# Patient Record
Sex: MALE | Race: WHITE
[De-identification: names, ages, dates, MRNs, and addresses within clinical notes are randomized per-mention and may not be internally consistent; named-entity substitution may affect disease eponyms.]

---

## 2018-02-04 ENCOUNTER — HOSPITAL ENCOUNTER (EMERGENCY)
Dept: HOSPITAL 74 - JER | Age: 73
Discharge: HOME | End: 2018-02-04
Payer: COMMERCIAL

## 2018-02-04 VITALS — SYSTOLIC BLOOD PRESSURE: 149 MMHG | DIASTOLIC BLOOD PRESSURE: 98 MMHG | TEMPERATURE: 97.6 F | HEART RATE: 76 BPM

## 2018-02-04 VITALS — BODY MASS INDEX: 38.7 KG/M2
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
## 2018-02-04 DIAGNOSIS — I10: ICD-10-CM

## 2018-02-04 DIAGNOSIS — G20: ICD-10-CM
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
## 2018-02-04 DIAGNOSIS — E78.00: ICD-10-CM

## 2018-02-04 DIAGNOSIS — J44.9: ICD-10-CM
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
## 2018-02-04 DIAGNOSIS — M54.5: Primary | ICD-10-CM

## 2018-02-04 NOTE — PDOC
History of Present Illness





- General


Chief Complaint: Injury


Stated Complaint: BACK PAIN S/P FALL


Time Seen by Provider: 02/04/18 04:07


History Source: Patient


Exam Limitations: No Limitations





- History of Present Illness


Initial Comments: 





02/04/18 06:13


72-year-old male with history of Parkinson's presents to the emergency 

department complaining of low back pain. Patient states she tripped on a piece 

of carpet and slid onto the ground after hitting against the wall. He denied 

any loss of consciousness, headache, dizziness, Lightheadedness, facial pains, 

neck pains, chest pain, shortness of breath, abdominal pains, flank pains, 

urinary symptoms, bladder or bowel dysfunction. Pain is described as 5/10 dull 

nonradiating intermittent discomfort. Pain is exacerbated on certain movements 

and alleviated at rest.


Occurred: reports: this morning





Past History





- Past Medical History


Allergies/Adverse Reactions: 


 Allergies











Allergy/AdvReac Type Severity Reaction Status Date / Time


 


No Known Allergies Allergy   Verified 02/04/18 03:24











Home Medications: 


Ambulatory Orders





NK [No Known Home Medication]  02/04/18 








Hypercholesterolemia: Yes





- Suicide/Smoking/Psychosocial Hx


Smoking Status: No


Smoking History: Never smoked


Have you smoked in the past 12 months: No


Number of Cigarettes Smoked Daily: 0


Information on smoking cessation initiated: No


Hx Alcohol Use: No


Drug/Substance Use Hx: No


Substance Use Type: None


Hx Substance Use Treatment: No





**Review of Systems





- Review of Systems


Able to Perform ROS?: Yes


Comments:: 





02/04/18 06:15


CONSTITUTIONAL: 


Absent: fever, chills, diaphoresis, generalized weakness, malaise, loss of 

appetite


HEENT: 


Absent: rhinorrhea, nasal congestion, throat pain, throat swelling, difficulty 

swallowing, mouth swelling, ear pain, eye pain, visual Changes


CARDIOVASCULAR: 


Absent: chest pain, loss of consciousness, palpitations, irregular heart rate, 

peripheral edema


RESPIRATORY: 


Absent: cough, shortness of breath, dyspnea with exertion, orthopnea, wheezing, 

stridor, hemoptysis


GASTROINTESTINAL:


Absent: abdominal pain, abdominal distension, nausea, vomiting, diarrhea, 

constipation, melena, hematochezia


GENITOURINARY: 


Absent: dysuria, frequency, urgency, hesitancy, hematuria, flank pain, genital 

pain


MUSCULOSKELETAL: 


+left para lum,bar/low back pain


Absent: myalgia, arthralgia, joint swelling


SKIN: 


Absent: rash, itching, pallor


HEMATOLOGIC/IMMUNOLOGIC: 


Absent: easy bleeding, easy bruising, lymphadenopathy, frequent infections


ENDOCRINE:


Absent: unexplained weight gain, unexplained weight loss, heat intolerance, 

cold intolerance


NEUROLOGIC: 


Absent: headache, focal weakness or paresthesias, dizziness, unsteady gait, 

seizure, mental status changes, bladder or bowel incontinence


PSYCHIATRIC: 


Absent: anxiety, depression, suicidal or homicidal ideation, hallucinations.


Is the patient limited English proficient: No





*Physical Exam





- Vital Signs


 Last Vital Signs











Temp Pulse Resp BP Pulse Ox


 


 97.6 F   76   20   149/98   99 


 


 02/04/18 02:00  02/04/18 02:00  02/04/18 02:00  02/04/18 02:00  02/04/18 02:00














- Physical Exam


Comments: 





02/04/18 06:15


GENERAL:


Well developed, well nourished. Awake and alert. No acute distress.


HEENT:


Normocephalic, atraumatic. PERRLA, EOMI. No conjunctival pallor. Sclera are non-

icteric. Moist mucous membranes. Oropharynx is clear.


NECK: 


Supple. Full ROM. No JVD. Carotid pulses 2+ and symmetric, without bruits. No 

thyromegaly. No lymphadenopathy.


CARDIOVASCULAR:


Regular rate and rhythm. No murmurs, rubs, or gallops. Distal pulses are 2+ and 

symmetric. 


PULMONARY: 


No evidence of respiratory distress. Lungs clear to auscultation bilaterally. 

No wheezing, rales or rhonchi.


ABDOMINAL:


Soft. Non-tender. Non-distended. No rebound or guarding. No organomegaly. 

Normoactive bowel sounds. 


MUSCULOSKELETAL 


Normal range of motion at all joints. No bony deformities or tenderness. No CVA 

tenderness.


EXTREMITIES: 


No cyanosis. No clubbing. No edema. No calf tenderness.


SKIN: 


Warm and dry. Normal capillary refill. No rashes. No jaundice. 


NEUROLOGICAL: 


Alert, awake, appropriate. Cranial nerves 2-12 intact. No deficits to light 

touch and temperature in face, upper extremities and lower extremities. No 

motor deficits in the in face, upper extremities and lower extremities. 

Normoreflexic in the upper and lower extremities. Normal speech. Toes are down-

going bilaterally. Gait is normal without ataxia.


PSYCHIATRIC: 


Cooperative. Good eye contact. Appropriate mood and affect.





ED Treatment Course





- RADIOLOGY


Radiograph Interpretation: 





02/04/18 06:15


Xray LS spine; neg





*DC/Admit/Observation/Transfer


Diagnosis at time of Disposition: 


Back pain


Qualifiers:


 Back pain location: low back pain Chronicity: acute Back pain laterality: 

unspecified Sciatica presence: without sciatica Qualified Code(s): M54.5 - Low 

back pain








- Discharge Dispostion


Disposition: HOME


Condition at time of disposition: Stable


Admit: No





- Referrals


Referrals: 


Alexis Marcelo MD [Primary Care Provider] - 


Francis Dennison MD [Staff Physician] - 





- Patient Instructions


Printed Discharge Instructions:  DI for Low Back Pain


Additional Instructions: 


Ice; 20 mins on alternating with 20 mins off for 48 hours while awake.


Rest


Elevate


Follow up with your orthopedic surgeon or the one listed on the discharge form.





Return to the ER for severe/persistent/worsening symptoms, extremity numbness/

tingling sensation.





- Post Discharge Activity

## 2018-02-05 ENCOUNTER — HOSPITAL ENCOUNTER (EMERGENCY)
Dept: HOSPITAL 74 - JER | Age: 73
Discharge: HOME | End: 2018-02-05
Payer: COMMERCIAL

## 2018-02-05 VITALS — DIASTOLIC BLOOD PRESSURE: 86 MMHG | SYSTOLIC BLOOD PRESSURE: 142 MMHG | HEART RATE: 79 BPM | TEMPERATURE: 97.9 F

## 2018-02-05 VITALS — BODY MASS INDEX: 36.4 KG/M2

## 2018-02-05 DIAGNOSIS — I10: ICD-10-CM

## 2018-02-05 DIAGNOSIS — J44.9: ICD-10-CM

## 2018-02-05 DIAGNOSIS — M54.5: Primary | ICD-10-CM

## 2018-02-05 DIAGNOSIS — R07.89: ICD-10-CM
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
## 2018-02-05 DIAGNOSIS — I25.10: ICD-10-CM

## 2018-02-05 DIAGNOSIS — G20: ICD-10-CM

## 2018-02-05 DIAGNOSIS — R07.81: ICD-10-CM

## 2018-02-05 DIAGNOSIS — E78.00: ICD-10-CM

## 2018-02-05 LAB
ALBUMIN SERPL-MCNC: 3.8 G/DL (ref 3.4–5)
ALP SERPL-CCNC: 76 U/L (ref 45–117)
ALT SERPL-CCNC: 12 U/L (ref 12–78)
ANION GAP SERPL CALC-SCNC: 6 MMOL/L (ref 8–16)
AST SERPL-CCNC: 32 U/L (ref 15–37)
BASOPHILS # BLD: 0.7 % (ref 0–2)
BILIRUB SERPL-MCNC: 2.2 MG/DL (ref 0.2–1)
BUN SERPL-MCNC: 27 MG/DL (ref 7–18)
CALCIUM SERPL-MCNC: 8.7 MG/DL (ref 8.5–10.1)
CHLORIDE SERPL-SCNC: 102 MMOL/L (ref 98–107)
CO2 SERPL-SCNC: 32 MMOL/L (ref 21–32)
CREAT SERPL-MCNC: 1.1 MG/DL (ref 0.7–1.3)
DEPRECATED RDW RBC AUTO: 13.6 % (ref 11.9–15.9)
EOSINOPHIL # BLD: 1.4 % (ref 0–4.5)
GLUCOSE SERPL-MCNC: 104 MG/DL (ref 74–106)
HCT VFR BLD CALC: 42.3 % (ref 35.4–49)
HGB BLD-MCNC: 14.3 GM/DL (ref 11.7–16.9)
INR BLD: 1.13 (ref 0.82–1.09)
LYMPHOCYTES # BLD: 12.4 % (ref 8–40)
MCH RBC QN AUTO: 28.8 PG (ref 25.7–33.7)
MCHC RBC AUTO-ENTMCNC: 33.9 G/DL (ref 32–35.9)
MCV RBC: 85.1 FL (ref 80–96)
MONOCYTES # BLD AUTO: 6.2 % (ref 3.8–10.2)
NEUTROPHILS # BLD: 79.3 % (ref 42.8–82.8)
PLATELET # BLD AUTO: 217 K/MM3 (ref 134–434)
PMV BLD: 7.7 FL (ref 7.5–11.1)
POTASSIUM SERPLBLD-SCNC: 4.4 MMOL/L (ref 3.5–5.1)
PROT SERPL-MCNC: 7.6 G/DL (ref 6.4–8.2)
PT PNL PPP: 12.8 SEC (ref 9.98–11.88)
RBC # BLD AUTO: 4.97 M/MM3 (ref 4–5.6)
SODIUM SERPL-SCNC: 140 MMOL/L (ref 136–145)
WBC # BLD AUTO: 9.2 K/MM3 (ref 4–10)

## 2018-02-05 NOTE — PDOC
History of Present Illness





- General


History Source: Patient


Exam Limitations: No Limitations





- History of Present Illness


Initial Comments: 





02/05/18 04:57


Patient is a 72 year old male with a significant past medical history of 

hypertension, CAD, and Parkinson's disease, who presents to the ED with 

complaints of left lower back pain that began yesterday afternoon while at 

home.  Patient reports falling in his bathroom yesterday afternoon, landing on 

the edge molding of the tub causing immediate pain.  He reports not taking any 

medication for pain but does state that he came to the ED for further 

evaluation last night and was given a pain medication injection and discharged.

  Patient reports coming to the ED tonight due to the continuation of the pain.

  He reports being worried about damage being done as he is not sure what 

organs are in that area that he fell on.  





Denies chest pain, Sob. Denies nausea, vomiting. Denies fevers, chills. Denies 

headache, loss of consciousness, head trauma. Denies any other symptoms. 





Allergies: None


Social history: Lives alone No smoking. Former drinker (Last 4 months ago). No 

illicit drugs. 


Surgical history: None


PMD: None


Neurology: Dr. Dennison


Cardiologist: Dr. Marcelo








<Sam Root - Last Filed: 02/05/18 04:57>





<Lynda Blackwood - Last Filed: 02/05/18 05:56>





- General


Chief Complaint: Back Pain


Stated Complaint: BACK PAIN


Time Seen by Provider: 02/05/18 03:11





Past History





<Sam Root - Last Filed: 02/05/18 04:57>





- Past Medical History


COPD: Yes


Hypercholesterolemia: Yes





- Immunization History


Immunization Up to Date: Yes





- Suicide/Smoking/Psychosocial Hx


Smoking Status: No


Smoking History: Never smoked


Have you smoked in the past 12 months: No


Number of Cigarettes Smoked Daily: 0


Information on smoking cessation initiated: No


Hx Alcohol Use: No


Drug/Substance Use Hx: No


Substance Use Type: None


Hx Substance Use Treatment: No





<Lynda Blackwood - Last Filed: 02/05/18 05:56>





- Past Medical History


Allergies/Adverse Reactions: 


 Allergies











Allergy/AdvReac Type Severity Reaction Status Date / Time


 


No Known Allergies Allergy   Verified 02/05/18 02:55











Home Medications: 


Ambulatory Orders





Aspirin [ASA -] 81 mg PO DAILY 02/05/18 


Carbidopa/Levodopa/Entacapone [Carbidopa-Levodopa-Enta 200 mg] 1 each PO TID 02/ 05/18 


Docusate Sodium [Dulcolax Stool Softener] 100 mg PO PRN PRN 02/05/18 


Furosemide [Lasix -] 40 mg PO DAILY 02/05/18 


Lubiprostone [Amitiza] 24 mcg PO DAILY 02/05/18 


Pramipexole Dihydrochloride [Mirapex -] 0.5 mg PO QID 02/05/18 


Rosuvastatin [Crestor -] 5 mg PO HS 02/05/18 











**Review of Systems





- Review of Systems


Able to Perform ROS?: Yes


Comments:: 





02/05/18 04:58


GENERAL/CONSTITUTIONAL: No fever or chills. No weakness.


HEAD, EYES, EARS, NOSE AND THROAT: No change in vision. No ear pain or 

discharge. No sore throat.


CARDIOVASCULAR: No chest pain or shortness of breath.


RESPIRATORY: No cough, wheezing, or hemoptysis.


GASTROINTESTINAL: No nausea, vomiting, diarrhea or constipation.


GENITOURINARY: No dysuria, frequency, or change in urination.


MUSCULOSKELETAL: +Lower left back pain. 


No joint or muscle swelling or pain. No neck pain.


SKIN: No rash


NEUROLOGIC: No headache, vertigo, loss of consciousness, or change in strength/

sensation.


ENDOCRINE: No increased thirst. No abnormal weight change.


HEMATOLOGIC/LYMPHATIC: No anemia, easy bleeding, or history of blood clots.


ALLERGIC/IMMUNOLOGIC: No hives or skin allergy.





All Other Systems: Reviewed and Negative





<Sam Root - Last Filed: 02/05/18 04:57>





*Physical Exam





- Vital Signs


 Last Vital Signs











Temp Pulse Resp BP Pulse Ox


 


 97.9 F   79   20   142/86   98 


 


 02/05/18 02:48  02/05/18 02:48  02/05/18 02:48  02/05/18 02:48  02/05/18 02:48














- Physical Exam


Comments: 





02/05/18 04:58


GENERAL: Awake, alert, and fully oriented, in no acute distress


HEAD: No signs of trauma


EYES: PERRLA, EOMI, sclera anicteric, conjunctiva clear


ENT: Auricles normal inspection, hearing grossly normal, nares patent, 

oropharynx clear without exudates. Moist mucosa


NECK: Normal ROM, supple, no lymphadenopathy, JVD, or masses


LUNGS: Breath sounds equal, clear to auscultation bilaterally.  No wheezes, and 

no crackles


HEART: Regular rate and rhythm, normal S1 and S2, no murmurs, rubs or gallops


ABDOMEN: +Left Lower ribs tender to palpation


Soft, nontender, normoactive bowel sounds.  No guarding, no rebound.  No masses


BACK: +Pleuritic back pain. No midline tenderness. No spinal tenderness. No 

flank tenderness. No tenderness on percussion and palpation. 


EXTREMITIES: Normal range of motion, no edema.  No clubbing or cyanosis. No 

cords, erythema, or tenderness


NEUROLOGICAL: Cranial nerves II through XII grossly intact.  Normal speech, 

normal gait


SKIN: Warm, Dry, normal turgor, no rashes or lesions noted.








<Sam Root - Last Filed: 02/05/18 04:57>





- Vital Signs


 Last Vital Signs











Temp Pulse Resp BP Pulse Ox


 


 97.9 F   79   20   142/86   98 


 


 02/05/18 02:48  02/05/18 02:48  02/05/18 02:48  02/05/18 02:48  02/05/18 02:48














<Lynda Blackwood - Last Filed: 02/05/18 05:56>





ED Treatment Course





- LABORATORY


CBC & Chemistry Diagram: 


 02/05/18 03:40





 02/05/18 03:40





- ADDITIONAL ORDERS


Additional order review: 


 Laboratory  Results











  02/05/18 02/05/18





  03:40 03:40


 


PT with INR   12.80 H


 


INR   1.13


 


Sodium  140 


 


Potassium  4.4 


 


Chloride  102 


 


Carbon Dioxide  32 


 


Anion Gap  6 L 


 


BUN  27 H D 


 


Creatinine  1.1 


 


Creat Clearance w eGFR  > 60 


 


Random Glucose  104  D 


 


Calcium  8.7 


 


Total Bilirubin  2.2 H D 


 


AST  32  D 


 


ALT  12  D 


 


Alkaline Phosphatase  76  D 


 


Total Protein  7.6 


 


Albumin  3.8 








 











  02/05/18





  03:40


 


RBC  4.97


 


MCV  85.1


 


MCHC  33.9


 


RDW  13.6


 


MPV  7.7


 


Neutrophils %  79.3  D


 


Lymphocytes %  12.4  D


 


Monocytes %  6.2


 


Eosinophils %  1.4


 


Basophils %  0.7














- Medications


Given in the ED: 


ED Medications














Discontinued Medications














Generic Name Dose Route Start Last Admin





  Trade Name Freq  PRN Reason Stop Dose Admin


 


Oxycodone/Acetaminophen  2 combo  02/05/18 03:32  02/05/18 04:19





  Percocet 5/325 -  PO  02/05/18 03:33  2 combo





  ONCE ONE   Administration














<Sam Root - Last Filed: 02/05/18 04:57>





- LABORATORY


CBC & Chemistry Diagram: 


 02/05/18 03:40





 02/05/18 03:40





<Lynda Blackwood - Last Filed: 02/05/18 05:56>





Medical Decision Making





- Medical Decision Making





02/05/18 04:19


Patient Name: RICKEY CONTRERAS


THIS IS A PRELIMINARY REPORT FROM IMAGING ON CALL


DATE OF SERVICE: 2018-02-05 03:48:41


IMAGES: [id number]


EXAM: CHEST CT WITHOUT CONTRAST


HISTORY: Status post fall


COMPARISON: None.


FINDINGS: There is no aortic aneurysm. No mediastinal hematoma. There is no 

significant


mediastinal or hilar adenopathy. The heart size is normal. The trachea and 

bronchi are patent.


There is no pleural or pericardial effusion. The lungs are clear. There is no 

pneumothorax. Please


refer to separate report of CT abdomen for abdominal findings. There are no 

fractures.


IMPRESSION: No evidence of acute traumatic pathology in the thorax.





02/05/18 04:27


Patient Name: RICKEY CONTRERAS


THIS IS A PRELIMINARY REPORT FROM IMAGING ON CALL


DATE OF SERVICE: 2018-02-05 03:51:34


IMAGES: [id number]


EXAM: ABDOMEN \T\ PELVIS CT W/O CONTR


HISTORY: Ileus or obstruction


COMPARISON: None.


FINDINGS: Lung bases are clear. The visualized cardiac chambers are normal size 

and


configuration. There are gallstones but no gallbladder inflammation. Normal 

unenhanced liver,


pancreas, spleen, adrenal glands and kidneys. The stomach and abdominal small 

and large bowel


are notable only for moderately prominent colonic stool and gas. There is no 

aortic aneurysm.


There is no significant retroperitoneal lymphadenopathy. Mild mesenteric 

panniculitis is likely an


incidental finding.


The pelvic small and large bowel are normal. The appendix is normal. The 

urinary bladder and


prostate gland are normal. No pelvic free fluid is identified. There is no 

significant pelvic


lymphadenopathy. Small fat containing bilateral inguinal hernias are noted.

There is a grade 1


anterolisthesis of L5 on S1 with degenerative changes of the distal epidural 

bilateral L5 pars


defects.


IMPRESSION: Gallstones.


No bowel obstruction or ileus.


Moderately prominent colonic stool and gas.


THIS DOCUMENT HAS BEEN ELECTRONICALLY SIGNED


02/05/18 05:56


Labs normal; exam normal; imaging normal.





<Lynda Blackwood - Last Filed: 02/05/18 05:56>





*DC/Admit/Observation/Transfer





- Attestations


Scribe Attestion: 





02/05/18 04:58





Documentation prepared by Sam Root, acting as medical scribe for Lynda Blackwood MD/DO.





<Sam Root - Last Filed: 02/05/18 04:57>





- Discharge Dispostion


Admit: No





<Lynda Blackwood - Last Filed: 02/05/18 05:56>


Diagnosis at time of Disposition: 


 Musculoskeletal pain








- Discharge Dispostion


Disposition: HOME


Condition at time of disposition: Stable





- Patient Instructions


Printed Discharge Instructions:  DI for Musculoskeletal Pain